# Patient Record
Sex: MALE | Race: WHITE | NOT HISPANIC OR LATINO
[De-identification: names, ages, dates, MRNs, and addresses within clinical notes are randomized per-mention and may not be internally consistent; named-entity substitution may affect disease eponyms.]

---

## 2022-04-12 VITALS — WEIGHT: 168 LBS | BODY MASS INDEX: 24.05 KG/M2 | HEIGHT: 70 IN

## 2022-04-12 PROBLEM — Z00.00 ENCOUNTER FOR PREVENTIVE HEALTH EXAMINATION: Status: ACTIVE | Noted: 2022-04-12

## 2022-04-19 ENCOUNTER — APPOINTMENT (OUTPATIENT)
Dept: ORTHOPEDIC SURGERY | Facility: CLINIC | Age: 62
End: 2022-04-19
Payer: OTHER MISCELLANEOUS

## 2022-04-19 VITALS — BODY MASS INDEX: 24.05 KG/M2 | WEIGHT: 168 LBS | HEIGHT: 70 IN

## 2022-04-19 PROCEDURE — 99072 ADDL SUPL MATRL&STAF TM PHE: CPT

## 2022-04-19 PROCEDURE — 99214 OFFICE O/P EST MOD 30 MIN: CPT

## 2022-04-19 NOTE — HISTORY OF PRESENT ILLNESS
[de-identified] : F/U Right shoulder \par Pt reports some improvement since last visit\par Reports WC denying PT- states he is doing HEP \par Denies taking pain meds.

## 2022-04-19 NOTE — PHYSICAL EXAM
[Right] : right shoulder [5___] : internal rotation 5[unfilled]/5 [] : no scapular winging [TWNoteComboBox7] : active forward flexion 160 degrees [TWNoteComboBox6] : internal rotation L1 [de-identified] : external rotation 60 degrees

## 2022-04-19 NOTE — REASON FOR VISIT
[FreeTextEntry2] : right shoulder pain since 1/5/22 after slipping at work, landing on the shoulder, dislocating it and popping it back into place himself. Went to [facility], had xrays and was told to follow up with orthopedic. pt felt like it was dislocated and he was able to reduce it himself. lhd. pt noticed still feels unstable 75% better a deformity, no hx dislocation in past or instability. hx left shoulder instability pt 6 weeks 3x/week wants to continue working full duty. no meds occasionally waking him at night

## 2022-06-07 ENCOUNTER — APPOINTMENT (OUTPATIENT)
Dept: ORTHOPEDIC SURGERY | Facility: CLINIC | Age: 62
End: 2022-06-07
Payer: OTHER MISCELLANEOUS

## 2022-06-07 PROCEDURE — 99214 OFFICE O/P EST MOD 30 MIN: CPT

## 2022-06-07 PROCEDURE — 99072 ADDL SUPL MATRL&STAF TM PHE: CPT

## 2022-06-07 NOTE — ASSESSMENT
[FreeTextEntry1] : Plan is for patient to get MRI to further evaluate the noted continuing instability in the right shoulder. Will folow up after MRI results

## 2022-06-07 NOTE — HISTORY OF PRESENT ILLNESS
[de-identified] : feels 75% normal. no new dislocation. did about 6 weeks of therapy. Patient complaining about persistent instability in shoulder
car

## 2022-06-07 NOTE — REASON FOR VISIT
[FreeTextEntry2] : right shoulder pain since 1/5/22 after slipping at work, landing on the shoulder, dislocating it and popping it back into place himself. Went to [facility], had xrays and was told to follow up with orthopedic. pt felt like it was dislocated and he was able to reduce it himself. lhd. pt noticed still feels unstable 75% better a deformity, no hx dislocation in past or instability. hx left shoulder instability pt 6 weeks 3x/week wants to continue working full duty. no meds occasionally waking him at night

## 2022-06-07 NOTE — PHYSICAL EXAM
[Right] : right shoulder [5 ___] : forward flexion 5[unfilled]/5 [5___] : internal rotation 5[unfilled]/5 [] : negative Molina [FreeTextEntry9] : comp to 65, symm ir at t8 [TWNoteComboBox7] : active forward flexion 175 degrees [TWNoteComboBox6] : False [de-identified] : external rotation 60 degrees

## 2022-06-28 ENCOUNTER — RESULT REVIEW (OUTPATIENT)
Age: 62
End: 2022-06-28

## 2022-07-05 ENCOUNTER — APPOINTMENT (OUTPATIENT)
Dept: ORTHOPEDIC SURGERY | Facility: CLINIC | Age: 62
End: 2022-07-05

## 2022-07-05 VITALS — WEIGHT: 168 LBS | BODY MASS INDEX: 24.05 KG/M2 | HEIGHT: 70 IN

## 2022-07-05 DIAGNOSIS — M25.311 OTHER INSTABILITY, RIGHT SHOULDER: ICD-10-CM

## 2022-07-05 DIAGNOSIS — J30.2 OTHER SEASONAL ALLERGIC RHINITIS: ICD-10-CM

## 2022-07-05 DIAGNOSIS — Z78.9 OTHER SPECIFIED HEALTH STATUS: ICD-10-CM

## 2022-07-05 DIAGNOSIS — S43.004S UNSPECIFIED DISLOCATION OF RIGHT SHOULDER JOINT, SEQUELA: ICD-10-CM

## 2022-07-05 DIAGNOSIS — S43.431A SUPERIOR GLENOID LABRUM LESION OF RIGHT SHOULDER, INITIAL ENCOUNTER: ICD-10-CM

## 2022-07-05 PROCEDURE — 99072 ADDL SUPL MATRL&STAF TM PHE: CPT

## 2022-07-05 PROCEDURE — 99214 OFFICE O/P EST MOD 30 MIN: CPT

## 2022-07-05 RX ORDER — CLOBETASOL PROPIONATE 0.5 MG/G
0.05 CREAM TOPICAL
Qty: 30 | Refills: 0 | Status: COMPLETED | COMMUNITY
Start: 2022-06-29

## 2022-07-05 RX ORDER — MUPIROCIN 20 MG/G
2 OINTMENT TOPICAL
Qty: 22 | Refills: 0 | Status: COMPLETED | COMMUNITY
Start: 2022-02-09

## 2022-07-05 RX ORDER — DOXYCYCLINE HYCLATE 100 MG/1
100 TABLET ORAL
Qty: 42 | Refills: 0 | Status: COMPLETED | COMMUNITY
Start: 2022-06-13

## 2022-07-05 RX ORDER — HYDROCORTISONE 25 MG/G
2.5 OINTMENT TOPICAL
Qty: 20 | Refills: 0 | Status: COMPLETED | COMMUNITY
Start: 2022-03-18

## 2022-07-05 RX ORDER — DOXYCYCLINE HYCLATE 100 MG/1
100 CAPSULE ORAL
Qty: 20 | Refills: 0 | Status: COMPLETED | COMMUNITY
Start: 2022-02-09

## 2022-07-05 RX ORDER — AZELASTINE HYDROCHLORIDE 205.5 UG/1
0.15 SPRAY, METERED NASAL
Qty: 30 | Refills: 0 | Status: ACTIVE | COMMUNITY
Start: 2022-05-07

## 2022-07-05 RX ORDER — TRIAMCINOLONE ACETONIDE 1 MG/G
0.1 CREAM TOPICAL
Qty: 15 | Refills: 0 | Status: COMPLETED | COMMUNITY
Start: 2022-05-17

## 2022-07-05 RX ORDER — CLOTRIMAZOLE AND BETAMETHASONE DIPROPIONATE 10; .5 MG/G; MG/G
1-0.05 CREAM TOPICAL
Qty: 45 | Refills: 0 | Status: COMPLETED | COMMUNITY
Start: 2022-03-18

## 2022-07-05 RX ORDER — NEOMYCIN SULFATE, POLYMYXIN B SULFATE, HYDROCORTISONE 3.5; 10000; 1 MG/ML; [USP'U]/ML; MG/ML
1 SOLUTION/ DROPS AURICULAR (OTIC)
Qty: 10 | Refills: 0 | Status: COMPLETED | COMMUNITY
Start: 2022-06-13

## 2022-07-05 NOTE — DATA REVIEWED
[MRI] : MRI [Right] : of the right [Shoulder] : shoulder [Report was reviewed and noted in the chart] : The report was reviewed and noted in the chart [FreeTextEntry1] : IMPRESSION:\par \par \par SLAP tear extending into the biceps-labral anchor. Labral tearing propagates\par into the posteroinferior quadrant.\par \par Mild AC joint arthrosis.\par \par Intact rotator cuff.

## 2022-07-05 NOTE — ASSESSMENT
[FreeTextEntry1] : I have personally reviewed all previous images as well as imaging reports. I have reviewed any previous medical records including physical therapy reports, and reports from referring physicians. \par \par Options were discussed with patient including surgery and PT. The plan at this time is to go forward with surgery in the future. The plan in the interim is to continue HEP and f/u for surgical intervention

## 2022-07-05 NOTE — HISTORY OF PRESENT ILLNESS
[de-identified] : Pt is following up today to review RT shoulder MRI. Done at .   [FreeTextEntry1] : RT shoulder

## 2022-07-05 NOTE — REASON FOR VISIT
[FreeTextEntry2] : right shoulder pain since 1/5/22 after slipping at work, landing on the shoulder, dislocating it and popping it back into place himself. Went to [facility], had xrays and was told to follow up with orthopedic. pt felt like it was dislocated and he was able to reduce it himself. lhd. pt noticed still feels unstable 75% better a deformity, no hx dislocation in past or instability. hx left shoulder instability pt 6 weeks 3x/week wants to continue working full duty. no meds occasionally waking him at night

## 2022-07-05 NOTE — PHYSICAL EXAM
[Right] : right shoulder [5 ___] : forward flexion 5[unfilled]/5 [5___] : internal rotation 5[unfilled]/5 [] : negative Molina [FreeTextEntry9] : Sym IR T10  [TWNoteComboBox7] : active forward flexion 180 degrees [de-identified] : external rotation 70 degrees

## 2023-10-02 ENCOUNTER — APPOINTMENT (OUTPATIENT)
Dept: ORTHOPEDIC SURGERY | Facility: CLINIC | Age: 63
End: 2023-10-02
Payer: COMMERCIAL

## 2023-10-02 PROCEDURE — 99213 OFFICE O/P EST LOW 20 MIN: CPT

## 2023-10-02 RX ORDER — PREDNISONE 20 MG/1
20 TABLET ORAL
Qty: 10 | Refills: 0 | Status: DISCONTINUED | COMMUNITY
Start: 2023-09-13

## 2023-10-02 RX ORDER — LEVOCETIRIZINE DIHYDROCHLORIDE 5 MG/1
5 TABLET ORAL
Qty: 90 | Refills: 0 | Status: DISCONTINUED | COMMUNITY
Start: 2023-04-26

## 2023-10-02 RX ORDER — OMEPRAZOLE 40 MG/1
40 CAPSULE, DELAYED RELEASE ORAL
Qty: 30 | Refills: 0 | Status: ACTIVE | COMMUNITY
Start: 2023-07-17

## 2023-10-02 RX ORDER — INDOMETHACIN 50 MG/1
50 CAPSULE ORAL
Qty: 30 | Refills: 0 | Status: ACTIVE | COMMUNITY
Start: 2023-07-17

## 2023-10-02 RX ORDER — ROSUVASTATIN CALCIUM 5 MG/1
5 TABLET, FILM COATED ORAL
Qty: 90 | Refills: 0 | Status: ACTIVE | COMMUNITY
Start: 2023-08-15